# Patient Record
Sex: FEMALE | URBAN - METROPOLITAN AREA
[De-identification: names, ages, dates, MRNs, and addresses within clinical notes are randomized per-mention and may not be internally consistent; named-entity substitution may affect disease eponyms.]

---

## 2020-05-14 ENCOUNTER — APPOINTMENT (OUTPATIENT)
Dept: URBAN - METROPOLITAN AREA CLINIC 319 | Age: 30
Setting detail: DERMATOLOGY
End: 2020-05-14

## 2020-05-14 DIAGNOSIS — B36.0 PITYRIASIS VERSICOLOR: ICD-10-CM

## 2020-05-14 PROCEDURE — OTHER MIPS QUALITY: OTHER

## 2020-05-14 PROCEDURE — OTHER PHOTO-DOCUMENTATION: OTHER

## 2020-05-14 PROCEDURE — OTHER PRESCRIPTION: OTHER

## 2020-05-14 PROCEDURE — 99202 OFFICE O/P NEW SF 15 MIN: CPT

## 2020-05-14 PROCEDURE — OTHER COUNSELING: OTHER

## 2020-05-14 RX ORDER — KETOCONAZOLE 20 MG/ML
SHAMPOO TOPICAL
Qty: 1 | Refills: 2 | Status: ERX | COMMUNITY
Start: 2020-05-14

## 2020-05-14 RX ORDER — KETOCONAZOLE 20 MG/G
CREAM TOPICAL BID
Qty: 1 | Refills: 3 | Status: ERX | COMMUNITY
Start: 2020-05-14

## 2020-05-14 ASSESSMENT — LOCATION SIMPLE DESCRIPTION DERM: LOCATION SIMPLE: LEFT LOWER BACK

## 2020-05-14 ASSESSMENT — LOCATION DETAILED DESCRIPTION DERM: LOCATION DETAILED: LEFT SUPERIOR MEDIAL MIDBACK

## 2020-05-14 ASSESSMENT — LOCATION ZONE DERM: LOCATION ZONE: TRUNK

## 2020-05-14 NOTE — HPI: RASH
What Type Of Note Output Would You Prefer (Optional)?: Standard Output
How Severe Is Your Rash?: mild
Is This A New Presentation, Or A Follow-Up?: Referral for Rash
Additional History: Patient states she was seen previously by [facility] a few years back for the same rash. Patient had a KOH done during her visit and was told it was some type of fungal rash which was treated by Topical body wash which resolved rash within days.
Who Is Your Referring Provider?: Brian BENTLEY

## 2021-07-26 ENCOUNTER — APPOINTMENT (OUTPATIENT)
Dept: URBAN - METROPOLITAN AREA CLINIC 319 | Age: 31
Setting detail: DERMATOLOGY
End: 2021-07-26

## 2021-07-26 DIAGNOSIS — L29.8 OTHER PRURITUS: ICD-10-CM

## 2021-07-26 DIAGNOSIS — B86 SCABIES: ICD-10-CM

## 2021-07-26 PROCEDURE — OTHER COUNSELING: OTHER

## 2021-07-26 PROCEDURE — OTHER NARROW BAND UVB ORDER: OTHER

## 2021-07-26 PROCEDURE — OTHER INTRAMUSCULAR KENALOG: OTHER

## 2021-07-26 PROCEDURE — OTHER PHOTOTHERAPY TREATMENT: OTHER

## 2021-07-26 PROCEDURE — 96372 THER/PROPH/DIAG INJ SC/IM: CPT

## 2021-07-26 PROCEDURE — 96910 PHOTCHMTX TAR&UVB/PTRLTM&UVB: CPT

## 2021-07-26 PROCEDURE — OTHER TREATMENT REGIMEN: OTHER

## 2021-07-26 PROCEDURE — OTHER PRESCRIPTION: OTHER

## 2021-07-26 PROCEDURE — 99213 OFFICE O/P EST LOW 20 MIN: CPT | Mod: 25

## 2021-07-26 PROCEDURE — OTHER PRESCRIPTION MEDICATION MANAGEMENT: OTHER

## 2021-07-26 PROCEDURE — OTHER MIPS QUALITY: OTHER

## 2021-07-26 RX ORDER — LEVOCETIRIZINE DIHYDROCHLORIDE 5 MG
TABLET ORAL
Qty: 30 | Refills: 4 | Status: ERX | COMMUNITY
Start: 2021-07-26

## 2021-07-26 RX ORDER — HYDROXYZINE HYDROCHLORIDE 10 MG/1
TABLET, FILM COATED ORAL
Qty: 60 | Refills: 2 | Status: ERX | COMMUNITY
Start: 2021-07-26

## 2021-07-26 RX ORDER — PERMETHRIN 50 MG/G
CREAM TOPICAL
Qty: 2 | Refills: 2 | Status: ERX | COMMUNITY
Start: 2021-07-26

## 2021-07-26 RX ORDER — TRIAMCINOLONE ACETONIDE 1 MG/G
CREAM TOPICAL BID
Qty: 1 | Refills: 1 | Status: ERX | COMMUNITY
Start: 2021-07-26

## 2021-07-26 RX ORDER — IVERMECTIN 3 MG/1
TABLET ORAL
Qty: 14 | Refills: 0 | Status: ERX | COMMUNITY
Start: 2021-07-26

## 2021-07-26 ASSESSMENT — LOCATION DETAILED DESCRIPTION DERM
LOCATION DETAILED: LEFT BUTTOCK
LOCATION DETAILED: RIGHT DISTAL POSTERIOR THIGH
LOCATION DETAILED: LEFT MEDIAL UPPER BACK
LOCATION DETAILED: LEFT MEDIAL UPPER BACK
LOCATION DETAILED: SUPERIOR THORACIC SPINE
LOCATION DETAILED: INFERIOR THORACIC SPINE
LOCATION DETAILED: LEFT DISTAL POSTERIOR THIGH

## 2021-07-26 ASSESSMENT — LOCATION ZONE DERM
LOCATION ZONE: TRUNK
LOCATION ZONE: LEG
LOCATION ZONE: TRUNK

## 2021-07-26 ASSESSMENT — LOCATION SIMPLE DESCRIPTION DERM
LOCATION SIMPLE: LEFT UPPER BACK
LOCATION SIMPLE: RIGHT POSTERIOR THIGH
LOCATION SIMPLE: UPPER BACK
LOCATION SIMPLE: LEFT BUTTOCK
LOCATION SIMPLE: LEFT POSTERIOR THIGH
LOCATION SIMPLE: LEFT UPPER BACK

## 2021-07-26 NOTE — PROCEDURE: PHOTOTHERAPY TREATMENT
Protocol For Nbuvb: The patient received NBUVB.
Total Body Energy: 140
Protocol For Photochemotherapy: Tar And Broad Band Uvb (Goeckerman Treatment): The patient received Photochemotherapy: Tar and Broad Band UVB (Goeckerman treatment).
Protocol For Photochemotherapy: Mineral Oil And Broad Band Uvb: The patient received Photochemotherapy: Mineral Oil and Broad Band UVB.
Protocol For Broad Band Uvb: The patient received Broad Band UVB.
Protocol For Photochemotherapy: Mineral Oil And Nbuvb: The patient received Photochemotherapy: Mineral Oil and NBUVB (mineral oil applied to all lesions prior to phototherapy).
Protocol For Protocol For Photochemotherapy For Severe Photoresponsive Dermatoses: Bath Puva: The patient received Photochemotherapy for severe photoresponsive dermatoses: Bath PUVA requiring at least 4 to 8 hours of care under direct physician supervision.
Render Post-Care In The Note: no
Detail Level: Generalized
Protocol For Photochemotherapy For Severe Photoresponsive Dermatoses: Tar And Broad Band Uvb (Goeckerman Treatment): The patient received Photochemotherapy for severe photoresponsive dermatoses: Tar and Broad Band UVB (Goeckerman treatment) requiring at least 4 to 8 hours of care under direct physician supervision.
Protocol For Photochemotherapy: Baby Oil And Nbuvb: The patient received Photochemotherapy: Baby Oil and NBUVB (baby oil applied to all lesions prior to phototherapy).
Protocol For Uva: The patient received UVA.
Protocol For Photochemotherapy: Petrolatum And Nbuvb: The patient received Photochemotherapy: Petrolatum and NBUVB (petrolatum applied to all lesions prior to phototherapy).
Protocol For Photochemotherapy: Triamcinolone Ointment And Nbuvb: The patient received Photochemotherapy: Triamcinolone and NBUVB (triamcinolone ointment applied to all lesions prior to phototherapy).
Protocol: NBUVB
Protocol For Photochemotherapy: Petrolatum And Broad Band Uvb: The patient received Photochemotherapy: Petrolatum and Broad Band UVB.
Protocol For Bath Puva: The patient received Bath PUVA.
Name Of Supervising Technician: Sharifa
Protocol For Photochemotherapy For Severe Photoresponsive Dermatoses: Petrolatum And Broad Band Uvb: The patient received Photochemotherapyfor severe photoresponsive dermatoses: Petrolatum and Broad Band UVB requiring at least 4 to 8 hours of care under direct physician supervision.
Post-Care Instructions: I reviewed with the patient in detail post-care instructions. Patient is to wear sun protection. Patients may expect sunburn like redness, discomfort and scabbing.
Protocol For Puva: The patient received PUVA.
Total Body Time: 0:36
Protocol For Photochemotherapy For Severe Photoresponsive Dermatoses: Petrolatum And Nbuvb: The patient received Photochemotherapy for severe photoresponsive dermatoses: Petrolatum and NBUVB requiring at least 4 to 8 hours of care under direct physician supervision.
Protocol For Uva1: The patient received UVA1.
Protocol For Nb Uva: The patient received NB UVA.
Consent: Written consent obtained.  The risks were reviewed with the patient including but not limited to: burn, pigmentary changes, pain, blistering, scabbing, redness, increased risk of skin cancers, and the remote possibility of scarring.
Protocol For Photochemotherapy For Severe Photoresponsive Dermatoses: Tar And Nbuvb (Goeckerman Treatment): The patient received Photochemotherapy for severe photoresponsive dermatoses: Tar and NBUVB (Goeckerman treatment) requiring at least 4 to 8 hours of care under direct physician supervision.
Protocol For Photochemotherapy For Severe Photoresponsive Dermatoses: Puva: The patient received Photochemotherapy for severe photoresponsive dermatoses: PUVA requiring at least 4 to 8 hours of care under direct physician supervision.
Protocol For Photochemotherapy: Tar And Nbuvb (Goeckerman Treatment): The patient received Photochemotherapy: Tar and NBUVB (Goeckerman treatment).

## 2021-07-26 NOTE — PROCEDURE: NARROW BAND UVB ORDER
Frequency: TIW
Protocol: NBUVB
Detail Level: Zone
Dose: 80% MED
Consent: Written consent obtained.  The risks were reviewed with the patient including but not limited to: burn, pigmentary changes, pain, blistering, scabbing, redness, increased risk of skin cancers, and the remote possibility of scarring.
Additional Comments: Patient skin type III

## 2021-07-26 NOTE — PROCEDURE: TREATMENT REGIMEN
Plan: Patients itches so badly she is causing bruising to her skin.  Her symptom areas area mainly legs, underarms, buttocks and groin area but now spreading to stomach and under breasts.  Given history or stay in [facility] over July 4 holiday, we are going to treat for scabies.  We will give enough medicine to treat  and child at home. Plan: Patients itches so badly she is causing bruising to her skin.  Her symptom areas area mainly legs, underarms, buttocks and groin area but now spreading to stomach and under breasts.  Given history or stay in [facility] over July 4 holiday, we are going to treat for scabies.  We will give enough medicine to treat  and child at home.

## 2021-07-26 NOTE — PROCEDURE: INTRAMUSCULAR KENALOG
Administered By (Optional): Natasha
Kenalog Preparation: kenalog
Total Volume (Ccs): 1.5
Concentration (Mg/Ml): 40.0
Add Option For Additional Mediation: No
Concentration (Mg/Ml) Of Additional Medication: 2.5
Detail Level: None
Consent: The risks of atrophy were reviewed with the patient.

## 2021-07-26 NOTE — PROCEDURE: TREATMENT REGIMEN
Initiate Treatment: Phototherapy 3x/ weekly until clear \\n\\nTriamcinolone cream apply a thin layer to irritation on body twice daily until clear, no longer than 6 weeks \\n\\nXyzal 5mg take 1 tablet by mouth once daily

## 2021-08-03 ENCOUNTER — APPOINTMENT (OUTPATIENT)
Dept: URBAN - METROPOLITAN AREA CLINIC 319 | Age: 31
Setting detail: DERMATOLOGY
End: 2021-08-03

## 2021-08-03 DIAGNOSIS — L29.8 OTHER PRURITUS: ICD-10-CM

## 2021-08-03 DIAGNOSIS — B86 SCABIES: ICD-10-CM

## 2021-08-03 PROBLEM — L30.9 DERMATITIS, UNSPECIFIED: Status: ACTIVE | Noted: 2021-08-03

## 2021-08-03 PROCEDURE — 99214 OFFICE O/P EST MOD 30 MIN: CPT | Mod: 25

## 2021-08-03 PROCEDURE — OTHER COUNSELING: OTHER

## 2021-08-03 PROCEDURE — OTHER ORDER TESTS: OTHER

## 2021-08-03 PROCEDURE — OTHER PHOTO-DOCUMENTATION: OTHER

## 2021-08-03 PROCEDURE — OTHER TREATMENT REGIMEN: OTHER

## 2021-08-03 PROCEDURE — OTHER BIOPSY BY SHAVE METHOD: OTHER

## 2021-08-03 PROCEDURE — 11103 TANGNTL BX SKIN EA SEP/ADDL: CPT

## 2021-08-03 PROCEDURE — OTHER PRESCRIPTION: OTHER

## 2021-08-03 PROCEDURE — 11102 TANGNTL BX SKIN SINGLE LES: CPT

## 2021-08-03 RX ORDER — PREDNISONE 10 MG/1
TABLET ORAL
Qty: 40 | Refills: 0 | Status: ERX | COMMUNITY
Start: 2021-08-03

## 2021-08-03 RX ORDER — HYDROXYZINE HYDROCHLORIDE 10 MG/1
TABLET, FILM COATED ORAL QHS
Qty: 30 | Refills: 1 | Status: ERX

## 2021-08-03 ASSESSMENT — LOCATION SIMPLE DESCRIPTION DERM
LOCATION SIMPLE: LEFT UPPER BACK
LOCATION SIMPLE: UPPER BACK
LOCATION SIMPLE: RIGHT POSTERIOR THIGH
LOCATION SIMPLE: LEFT POSTERIOR THIGH

## 2021-08-03 ASSESSMENT — LOCATION DETAILED DESCRIPTION DERM
LOCATION DETAILED: INFERIOR THORACIC SPINE
LOCATION DETAILED: LEFT DISTAL POSTERIOR THIGH
LOCATION DETAILED: LEFT INFERIOR LATERAL UPPER BACK
LOCATION DETAILED: LEFT MEDIAL UPPER BACK
LOCATION DETAILED: RIGHT DISTAL POSTERIOR THIGH

## 2021-08-03 ASSESSMENT — LOCATION ZONE DERM
LOCATION ZONE: TRUNK
LOCATION ZONE: LEG

## 2021-08-03 NOTE — PROCEDURE: PHOTO-DOCUMENTATION
Detail Level: Simple
Photo Preface (Leave Blank If You Do Not Want): Photographs were obtained today of all areas affected

## 2021-08-03 NOTE — PROCEDURE: BIOPSY BY SHAVE METHOD
Hide Biopsy Depth?: No
Billing Type: Third-Party Bill
Notification Instructions: Patient will be notified of biopsy results. However, patient instructed to call the office if not contacted within 2 weeks.
Was A Bandage Applied: Yes
Post-Care Instructions: I reviewed with the patient in detail post-care instructions. Patient is to keep the biopsy site dry overnight, and then apply bacitracin twice daily until healed. Patient may apply hydrogen peroxide soaks to remove any crusting.
Hemostasis: Ayah's
X Size Of Lesion In Cm: 0
Depth Of Biopsy: dermis
Information: Selecting Yes will display possible errors in your note based on the variables you have selected. This validation is only offered as a suggestion for you. PLEASE NOTE THAT THE VALIDATION TEXT WILL BE REMOVED WHEN YOU FINALIZE YOUR NOTE. IF YOU WANT TO FAX A PRELIMINARY NOTE YOU WILL NEED TO TOGGLE THIS TO 'NO' IF YOU DO NOT WANT IT IN YOUR FAXED NOTE.
Anesthesia Type: 1% lidocaine without epinephrine
Wound Care: Aquaphor
Biopsy Method: curette
Dressing: bandage
Consent: Written consent was obtained and risks were reviewed including but not limited to scarring, infection, bleeding, scabbing, incomplete removal, nerve damage and allergy to anesthesia.
Anesthesia Volume In Cc (Will Not Render If 0): 1
Detail Level: Detailed
Type Of Destruction Used: Curettage
Biopsy Type: H and E

## 2021-08-03 NOTE — PROCEDURE: TREATMENT REGIMEN
Continue Regimen: Phototherapy 3x/ weekly until clear \\n\\nTriamcinolone cream apply a thin layer to irritation on body twice daily until clear, no longer than 6 weeks \\n\\nXyzal 5mg take 1 tablet by mouth once daily
Initiate Treatment: hydroxyzine HCl 10 mg tablet Take 1-2 tablet by mouth at bedtime for itching\\n\\nprednisone 10 mg tablet Take 4 capsules by mouth at once for 4 days, then 3 capsules for 4 days, then 2 capsules for 4 days, then 1 capsule by 4 days
Detail Level: Zone

## 2021-08-24 ENCOUNTER — APPOINTMENT (OUTPATIENT)
Dept: URBAN - METROPOLITAN AREA CLINIC 319 | Age: 31
Setting detail: DERMATOLOGY
End: 2021-08-24

## 2021-08-24 DIAGNOSIS — L50.1 IDIOPATHIC URTICARIA: ICD-10-CM

## 2021-08-24 PROCEDURE — OTHER LAB REPORTS REVIEWED: OTHER

## 2021-08-24 PROCEDURE — 99214 OFFICE O/P EST MOD 30 MIN: CPT

## 2021-08-24 PROCEDURE — OTHER PRESCRIPTION: OTHER

## 2021-08-24 PROCEDURE — OTHER COUNSELING: OTHER

## 2021-08-24 PROCEDURE — OTHER TREATMENT REGIMEN: OTHER

## 2021-08-24 RX ORDER — SALICYLIC ACID 3 G/100G
LOTION TOPICAL
Qty: 60 | Refills: 3 | Status: ERX | COMMUNITY
Start: 2021-08-24

## 2021-08-24 NOTE — PROCEDURE: TREATMENT REGIMEN
Initiate Treatment: Take 2 Allegra tablets in the morning \\nHydroxyzine 2 tablets in the evening like previously prescribed
Detail Level: Zone
Plan: Patient had improved on prednisone but since this taper has started to itch again.  No overt urticarial lesions on body today, but significant histamine release on skin scratch. \\n\\nCall our office in a week to report progress\\nPossible referral to allergist if Urticaria doesn’t resolve in the next few weeks.  Will also consider adding 2 xyzal at night and hydroxyzine throughout day. \\n\\nLabs reviewed and WNL.  Will follow up in 3 weeks

## 2021-08-26 RX ORDER — PREDNISONE 10 MG/1
TABLET ORAL
Qty: 40 | Refills: 0 | Status: ERX

## 2021-09-14 ENCOUNTER — APPOINTMENT (OUTPATIENT)
Dept: URBAN - METROPOLITAN AREA CLINIC 319 | Age: 31
Setting detail: DERMATOLOGY
End: 2021-09-14

## 2021-09-14 ENCOUNTER — RX ONLY (RX ONLY)
Age: 31
End: 2021-09-14

## 2021-09-14 DIAGNOSIS — L50.1 IDIOPATHIC URTICARIA: ICD-10-CM

## 2021-09-14 PROCEDURE — OTHER TREATMENT REGIMEN: OTHER

## 2021-09-14 PROCEDURE — OTHER PRESCRIPTION: OTHER

## 2021-09-14 PROCEDURE — OTHER PRESCRIPTION MEDICATION MANAGEMENT: OTHER

## 2021-09-14 PROCEDURE — OTHER COUNSELING: OTHER

## 2021-09-14 PROCEDURE — 99214 OFFICE O/P EST MOD 30 MIN: CPT

## 2021-09-14 PROCEDURE — OTHER LAB REPORTS REVIEWED: OTHER

## 2021-09-14 RX ORDER — HYDROXYZINE HYDROCHLORIDE 10 MG/1
TABLET, FILM COATED ORAL QHS
Qty: 60 | Refills: 1 | Status: ERX

## 2021-09-14 RX ORDER — LEVOCETIRIZINE DIHYDROCHLORIDE 5 MG
TABLET ORAL
Qty: 60 | Refills: 3 | Status: ERX

## 2021-09-14 RX ORDER — SALICYLIC ACID 3 G/100G
LOTION TOPICAL
Qty: 60 | Refills: 3 | Status: ERX

## 2021-09-14 NOTE — PROCEDURE: TREATMENT REGIMEN
Detail Level: Zone
Plan: Patient did not receive Allegra from pharmacy after last follow up. \\n\\nPatient had improved on prednisone but since this taper has started to itch again.  No overt urticarial lesions on body today, but significant histamine release on skin scratch. \\n\\nCall our office in a week to report progress\\nPossible referral to allergist if Urticaria doesn’t resolve in the next few weeks. \\n\\nLabs reviewed and WNL.  Will follow up PRN pending status update in 1 week.
Initiate Treatment: Xyzal 5mg take 2 tablets by mouth at bedtime
Modify Regimen: Take 2 Allegra tablets in the morning\\n\\nHydroxyzine 10mg take 1 tablet every 4-6 hours as needed for itching